# Patient Record
Sex: MALE | URBAN - METROPOLITAN AREA
[De-identification: names, ages, dates, MRNs, and addresses within clinical notes are randomized per-mention and may not be internally consistent; named-entity substitution may affect disease eponyms.]

---

## 2018-03-08 ENCOUNTER — APPOINTMENT (RX ONLY)
Dept: URBAN - METROPOLITAN AREA CLINIC 352 | Facility: CLINIC | Age: 78
Setting detail: DERMATOLOGY
End: 2018-03-08

## 2018-03-08 DIAGNOSIS — L20.89 OTHER ATOPIC DERMATITIS: ICD-10-CM

## 2018-03-08 PROBLEM — D56.9 THALASSEMIA, UNSPECIFIED: Status: ACTIVE | Noted: 2018-03-08

## 2018-03-08 PROBLEM — I10 ESSENTIAL (PRIMARY) HYPERTENSION: Status: ACTIVE | Noted: 2018-03-08

## 2018-03-08 PROBLEM — L30.9 DERMATITIS, UNSPECIFIED: Status: ACTIVE | Noted: 2018-03-08

## 2018-03-08 PROBLEM — M12.9 ARTHROPATHY, UNSPECIFIED: Status: ACTIVE | Noted: 2018-03-08

## 2018-03-08 PROBLEM — N18.9 CHRONIC KIDNEY DISEASE, UNSPECIFIED: Status: ACTIVE | Noted: 2018-03-08

## 2018-03-08 PROBLEM — R23.3 SPONTANEOUS ECCHYMOSES: Status: ACTIVE | Noted: 2018-03-08

## 2018-03-08 PROBLEM — L29.8 OTHER PRURITUS: Status: ACTIVE | Noted: 2018-03-08

## 2018-03-08 PROBLEM — H91.90 UNSPECIFIED HEARING LOSS, UNSPECIFIED EAR: Status: ACTIVE | Noted: 2018-03-08

## 2018-03-08 PROCEDURE — ? COUNSELING

## 2018-03-08 PROCEDURE — ? PATHOLOGY DISCUSSION

## 2018-03-08 PROCEDURE — 99212 OFFICE O/P EST SF 10 MIN: CPT

## 2018-03-08 PROCEDURE — ? ADDITIONAL NOTES

## 2018-03-08 NOTE — PROCEDURE: ADDITIONAL NOTES
Additional Notes: Recommend pt to continue to apply mometasone ointment or the triamcinoke 0.1% cream to control itch\\nPt to keep follow up with immunology

## 2018-03-08 NOTE — HPI: BIOPSY FOLLOW-UP
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
Additional History: Pt has been using mometasone ointment bid orn for itch

## 2018-03-29 ENCOUNTER — APPOINTMENT (RX ONLY)
Dept: URBAN - METROPOLITAN AREA CLINIC 352 | Facility: CLINIC | Age: 78
Setting detail: DERMATOLOGY
End: 2018-03-29

## 2018-03-29 DIAGNOSIS — L82.1 OTHER SEBORRHEIC KERATOSIS: ICD-10-CM

## 2018-03-29 PROCEDURE — ? DEFER

## 2018-03-29 PROCEDURE — ? COUNSELING

## 2018-03-29 PROCEDURE — 99212 OFFICE O/P EST SF 10 MIN: CPT

## 2018-03-29 ASSESSMENT — LOCATION SIMPLE DESCRIPTION DERM: LOCATION SIMPLE: GROIN

## 2018-03-29 ASSESSMENT — LOCATION DETAILED DESCRIPTION DERM: LOCATION DETAILED: SUPRAPUBIC SKIN

## 2018-03-29 ASSESSMENT — LOCATION ZONE DERM: LOCATION ZONE: TRUNK

## 2018-06-07 ENCOUNTER — APPOINTMENT (RX ONLY)
Dept: URBAN - METROPOLITAN AREA CLINIC 352 | Facility: CLINIC | Age: 78
Setting detail: DERMATOLOGY
End: 2018-06-07

## 2018-06-07 DIAGNOSIS — L20.89 OTHER ATOPIC DERMATITIS: ICD-10-CM

## 2018-06-07 DIAGNOSIS — L85.3 XEROSIS CUTIS: ICD-10-CM

## 2018-06-07 DIAGNOSIS — L81.4 OTHER MELANIN HYPERPIGMENTATION: ICD-10-CM

## 2018-06-07 PROBLEM — L20.84 INTRINSIC (ALLERGIC) ECZEMA: Status: ACTIVE | Noted: 2018-06-07

## 2018-06-07 PROCEDURE — ? COUNSELING

## 2018-06-07 PROCEDURE — 99212 OFFICE O/P EST SF 10 MIN: CPT

## 2018-06-07 ASSESSMENT — LOCATION DETAILED DESCRIPTION DERM
LOCATION DETAILED: LEFT SUPERIOR UPPER BACK
LOCATION DETAILED: LEFT MID-UPPER BACK
LOCATION DETAILED: LEFT MEDIAL INFERIOR CHEST

## 2018-06-07 ASSESSMENT — SEVERITY ASSESSMENT: SEVERITY: CLEAR

## 2018-06-07 ASSESSMENT — LOCATION SIMPLE DESCRIPTION DERM
LOCATION SIMPLE: CHEST
LOCATION SIMPLE: LEFT UPPER BACK

## 2018-06-07 ASSESSMENT — LOCATION ZONE DERM: LOCATION ZONE: TRUNK

## 2019-12-19 ENCOUNTER — APPOINTMENT (RX ONLY)
Dept: URBAN - METROPOLITAN AREA CLINIC 352 | Facility: CLINIC | Age: 79
Setting detail: DERMATOLOGY
End: 2019-12-19

## 2019-12-19 DIAGNOSIS — M10.0 IDIOPATHIC GOUT: ICD-10-CM

## 2019-12-19 DIAGNOSIS — L82.1 OTHER SEBORRHEIC KERATOSIS: ICD-10-CM

## 2019-12-19 PROBLEM — D48.5 NEOPLASM OF UNCERTAIN BEHAVIOR OF SKIN: Status: ACTIVE | Noted: 2019-12-19

## 2019-12-19 PROBLEM — M10.042 IDIOPATHIC GOUT, LEFT HAND: Status: ACTIVE | Noted: 2019-12-19

## 2019-12-19 PROCEDURE — ? OBSERVATION

## 2019-12-19 PROCEDURE — 99213 OFFICE O/P EST LOW 20 MIN: CPT

## 2019-12-19 PROCEDURE — ? DEFER

## 2019-12-19 PROCEDURE — ? COUNSELING

## 2019-12-19 ASSESSMENT — LOCATION ZONE DERM
LOCATION ZONE: FINGER
LOCATION ZONE: TRUNK

## 2019-12-19 ASSESSMENT — LOCATION DETAILED DESCRIPTION DERM
LOCATION DETAILED: LEFT MEDIAL BUTTOCK
LOCATION DETAILED: LEFT DISTAL PALMAR INDEX FINGER

## 2019-12-19 ASSESSMENT — LOCATION SIMPLE DESCRIPTION DERM
LOCATION SIMPLE: LEFT BUTTOCK
LOCATION SIMPLE: LEFT INDEX FINGER

## 2019-12-19 NOTE — HPI: RASH
What Type Of Note Output Would You Prefer (Optional)?: Standard Output
How Severe Is Your Rash?: moderate
Is This A New Presentation, Or A Follow-Up?: Rash
Additional History: He went to the ER last night and they gave him Keflex, septra, oxycodone, and hydroxyzine. The swelling started yesterday.

## 2019-12-19 NOTE — PROCEDURE: OBSERVATION
Size Of Lesion In Cm (Optional): 0
Body Location Override (Optional - Billing Will Still Be Based On Selected Body Map Location If Applicable): left index and left middle digit
Detail Level: Detailed

## 2020-01-09 ENCOUNTER — APPOINTMENT (RX ONLY)
Dept: URBAN - METROPOLITAN AREA CLINIC 352 | Facility: CLINIC | Age: 80
Setting detail: DERMATOLOGY
End: 2020-01-09

## 2020-01-09 DIAGNOSIS — L0390 CELLULITIS AND ABSCESS OF UNSPECIFIED SITES: ICD-10-CM | Status: IMPROVED

## 2020-01-09 DIAGNOSIS — L0391 CELLULITIS AND ABSCESS OF UNSPECIFIED SITES: ICD-10-CM | Status: IMPROVED

## 2020-01-09 PROBLEM — D48.5 NEOPLASM OF UNCERTAIN BEHAVIOR OF SKIN: Status: ACTIVE | Noted: 2020-01-09

## 2020-01-09 PROBLEM — L08.9 LOCAL INFECTION OF THE SKIN AND SUBCUTANEOUS TISSUE, UNSPECIFIED: Status: ACTIVE | Noted: 2020-01-09

## 2020-01-09 PROCEDURE — ? OTHER

## 2020-01-09 PROCEDURE — 11102 TANGNTL BX SKIN SINGLE LES: CPT

## 2020-01-09 PROCEDURE — ? BIOPSY BY SHAVE METHOD

## 2020-01-09 PROCEDURE — 99212 OFFICE O/P EST SF 10 MIN: CPT | Mod: 25

## 2020-01-09 NOTE — PROCEDURE: OTHER
Detail Level: Zone
Other (Free Text): Patient is to continue follow up with PCP and rheumatology regarding arthritic changes versus gout. He is to finish antibiotics as directed
Note Text (......Xxx Chief Complaint.): This diagnosis correlates with the

## 2020-01-09 NOTE — PROCEDURE: BIOPSY BY SHAVE METHOD
Render Path Notes In Note?: No
Hemostasis: Markus's
Biopsy Method: Personna blade
Silver Nitrate Text: The wound bed was treated with silver nitrate after the biopsy was performed.
Curettage Text: The wound bed was treated with curettage after the biopsy was performed.
Was A Bandage Applied: Yes
Lab: -[id number]
Billing Type: Third-Party Bill
Lab Facility: [id number]
Detail Level: Detailed
Cryotherapy Text: The wound bed was treated with cryotherapy after the biopsy was performed.
Post-Care Instructions: I reviewed with the patient in detail post-care instructions. Patient is to keep the biopsy site dry overnight, and then apply bacitracin twice daily until healed. Patient may apply hydrogen peroxide soaks to remove any crusting.
Depth Of Biopsy: dermis
X Size Of Lesion In Cm: 0
Biopsy Type: H and E
Consent: Written consent was obtained and risks were reviewed including but not limited to scarring, infection, bleeding, scabbing, incomplete removal, nerve damage and allergy to anesthesia.
Wound Care: Polysporin ointment
Electrodesiccation Text: The wound bed was treated with electrodesiccation after the biopsy was performed.
Notification Instructions: Patient will be notified of biopsy results. However, patient instructed to call the office if not contacted within 2 weeks.
Anesthesia Volume In Cc (Will Not Render If 0): 0.5
Electrodesiccation And Curettage Text: The wound bed was treated with electrodesiccation
Dressing: bandage
Type Of Destruction Used: Electrodesiccation and Curettage

## 2020-01-29 ENCOUNTER — APPOINTMENT (RX ONLY)
Dept: URBAN - METROPOLITAN AREA CLINIC 81 | Facility: CLINIC | Age: 80
Setting detail: DERMATOLOGY
End: 2020-01-29

## 2020-02-19 ENCOUNTER — APPOINTMENT (RX ONLY)
Dept: URBAN - METROPOLITAN AREA CLINIC 81 | Facility: CLINIC | Age: 80
Setting detail: DERMATOLOGY
End: 2020-02-19

## 2020-02-19 VITALS — SYSTOLIC BLOOD PRESSURE: 123 MMHG | DIASTOLIC BLOOD PRESSURE: 51 MMHG | HEART RATE: 67 BPM

## 2020-02-19 PROBLEM — C44.41 BASAL CELL CARCINOMA OF SKIN OF SCALP AND NECK: Status: ACTIVE | Noted: 2020-02-19

## 2020-02-19 PROCEDURE — ? PRESCRIPTION

## 2020-02-19 PROCEDURE — 17311 MOHS 1 STAGE H/N/HF/G: CPT

## 2020-02-19 PROCEDURE — 14021 TIS TRNFR S/A/L 10.1-30 SQCM: CPT

## 2020-02-19 PROCEDURE — ? MOHS SURGERY

## 2020-02-19 PROCEDURE — ? REPAIR NOTE

## 2020-03-04 ENCOUNTER — APPOINTMENT (RX ONLY)
Dept: URBAN - METROPOLITAN AREA CLINIC 81 | Facility: CLINIC | Age: 80
Setting detail: DERMATOLOGY
End: 2020-03-04

## 2020-03-04 DIAGNOSIS — Z48.02 ENCOUNTER FOR REMOVAL OF SUTURES: ICD-10-CM

## 2020-03-04 PROCEDURE — 99024 POSTOP FOLLOW-UP VISIT: CPT

## 2020-03-04 PROCEDURE — ? SUTURE REMOVAL (GLOBAL PERIOD)

## 2020-03-04 ASSESSMENT — LOCATION ZONE DERM: LOCATION ZONE: SCALP

## 2020-03-04 ASSESSMENT — LOCATION SIMPLE DESCRIPTION DERM: LOCATION SIMPLE: POSTERIOR SCALP

## 2020-03-04 ASSESSMENT — LOCATION DETAILED DESCRIPTION DERM: LOCATION DETAILED: RIGHT SUPERIOR OCCIPITAL SCALP

## 2020-03-04 NOTE — PROCEDURE: SUTURE REMOVAL (GLOBAL PERIOD)
Body Location Override (Optional - Billing Will Still Be Based On Selected Body Map Location If Applicable): Rt Inferior Occipital Scalp
Detail Level: Detailed
Add 74882 Cpt? (Important Note: In 2017 The Use Of 43103 Is Being Tracked By Cms To Determine Future Global Period Reimbursement For Global Periods): yes

## 2020-07-09 ENCOUNTER — APPOINTMENT (RX ONLY)
Dept: URBAN - METROPOLITAN AREA CLINIC 352 | Facility: CLINIC | Age: 80
Setting detail: DERMATOLOGY
End: 2020-07-09

## 2020-07-09 DIAGNOSIS — Z71.89 OTHER SPECIFIED COUNSELING: ICD-10-CM

## 2020-07-09 DIAGNOSIS — L82.1 OTHER SEBORRHEIC KERATOSIS: ICD-10-CM

## 2020-07-09 DIAGNOSIS — D22 MELANOCYTIC NEVI: ICD-10-CM

## 2020-07-09 DIAGNOSIS — Z85.828 PERSONAL HISTORY OF OTHER MALIGNANT NEOPLASM OF SKIN: ICD-10-CM

## 2020-07-09 DIAGNOSIS — L81.4 OTHER MELANIN HYPERPIGMENTATION: ICD-10-CM

## 2020-07-09 PROBLEM — D22.5 MELANOCYTIC NEVI OF TRUNK: Status: ACTIVE | Noted: 2020-07-09

## 2020-07-09 PROCEDURE — 99213 OFFICE O/P EST LOW 20 MIN: CPT

## 2020-07-09 PROCEDURE — ? ADDITIONAL NOTES

## 2020-07-09 PROCEDURE — ? RETURN TO REFERRING PROVIDER

## 2020-07-09 PROCEDURE — ? COUNSELING

## 2020-07-09 PROCEDURE — ? FULL BODY SKIN EXAM

## 2020-07-09 ASSESSMENT — LOCATION SIMPLE DESCRIPTION DERM
LOCATION SIMPLE: POSTERIOR NECK
LOCATION SIMPLE: UPPER BACK
LOCATION SIMPLE: CHEST
LOCATION SIMPLE: RIGHT CHEEK

## 2020-07-09 ASSESSMENT — LOCATION ZONE DERM
LOCATION ZONE: FACE
LOCATION ZONE: TRUNK
LOCATION ZONE: NECK

## 2020-07-09 ASSESSMENT — LOCATION DETAILED DESCRIPTION DERM
LOCATION DETAILED: RIGHT SUPERIOR POSTERIOR NECK
LOCATION DETAILED: LOWER STERNUM
LOCATION DETAILED: SUPERIOR THORACIC SPINE
LOCATION DETAILED: RIGHT INFERIOR CENTRAL MALAR CHEEK

## 2021-11-08 NOTE — PROCEDURE: REPAIR NOTE
S-(situation): Patient fell over the weekend and now has pain in rt ankle and rt knee. No redness but mild pain for two days since he fell on the right side of his body.     B-(background): Pain has not inproved on the rt knee and ankle.     A-(assessment): Patient needs assessment of injury from fall.     R-(recommendations): No clinic appointments, patient will go to [facility] for urgent care.           Reason for Disposition    [1] MODERATE pain (e.g., interferes with normal activities, limping) AND [2] present > 3 days    [1] Swollen joint AND [2] no fever or redness    Additional Information    Negative: Followed an ankle injury    Negative: Foot pain is main symptom    Negative: Thigh or calf pain is main symptom    Negative: Thigh or calf swelling is main symptom    Negative: Entire foot is cool or blue in comparison to other side    Negative: [1] Swollen joint AND [2] fever    Negative: [1] Red area or streak AND [2] fever    Negative: Patient sounds very sick or weak to the triager    Negative: [1] SEVERE pain (e.g., excruciating, unable to walk) AND [2] not improved after 2 hours of pain medicine    Negative: [1] Thigh or calf pain AND [2] only 1 side AND [3] present > 1 hour    Negative: [1] Calf swelling AND [2] only 1 side    Negative: [1] Looks infected (spreading redness, pus) AND [2] large red area (> 2 in. or 5 cm)    Negative: Looks like a boil, infected sore, or deep ulcer    Negative: [1] Redness of the skin AND [2] no fever    Negative: [1] Very swollen joint AND [2] no fever    Negative: [1] MILD pain (e.g., does not interfere with normal activities) AND [2] present > 7 days    Negative: Ankle pain is a chronic symptom (recurrent or ongoing AND present > 4 weeks)    Negative: Caused by overuse from recent vigorous activity (e.g., vigorous activity, running)    Negative: Ankle pain    Protocols used: ANKLE PAIN-A-      Tiffany Singh RN  [facility]      Island Pedicle Flap With Canthal Suspension Text: The defect edges were debeveled with a #15 scalpel blade.  Given the location of the defect, shape of the defect and the proximity to free margins an island pedicle advancement flap was deemed most appropriate.  Using a sterile surgical marker, an appropriate advancement flap was drawn incorporating the defect, outlining the appropriate donor tissue and placing the expected incisions within the relaxed skin tension lines where possible. The area thus outlined was incised deep to adipose tissue with a #15 scalpel blade.  The skin margins were undermined to an appropriate distance in all directions around the primary defect and laterally outward around the island pedicle utilizing iris scissors.  There was minimal undermining beneath the pedicle flap. A suspension suture was placed in the canthal tendon to prevent tension and prevent ectropion.

## 2021-12-08 NOTE — PROCEDURE: DEFER
no
Procedure To Be Performed At Next Visit: Biopsy by shave method
Introduction Text (Please End With A Colon): The following procedure was deferred:
Detail Level: Detailed

## 2022-10-26 NOTE — PROCEDURE: REPAIR NOTE
Thierry Samuel      1937    A DME order for supplies has been placed to [facility] 10/18 and today. If there are any issues with your order including not receiving the order please call [facility] at [phone] option 3. They can also provide a tracking number for you if you had supplies shipped to you.    Wound Dressing Change: Right anterior and anterior distal lower leg  Cleanse with mild unscented soap and water  Apply Plurogel to 1/10th piece of 4x5 hydrofera blue ready transfer, then apply to each wound  Apply Spandage size 6 (MTSpandage size 3) from just above toes to just below knee  Then cover with 1 5x9 ABD (on top of Spandage) and secure with 1 roll gauze and tape  Change daily or every other day    Whenever you sit raise your ankle above your hips to promote wound healing.    To Order more plurogel: [email or web address] or call [phone] to place an order for 0.7 oz tube  Use PLUROHEALS (all caps) at checkout in the discount code section to decrease price to $55     You may wash and re-use Spandage for up to a week (hand wash with mild soap, rinse and hang to dry)     Mayela Love PA-C October 26, 2022    Call us at [phone] if you have any questions about your wounds, have redness or swelling around your wound, have a fever of 101 or greater or if you have any other problems or concerns. We answer the phone Monday through Friday 8 am to 4 pm, please leave a message as we check the voicemail frequently throughout the day.     If you had a positive experience please indicate that on your patient satisfaction survey form that [facility] will be sending you.    It was a pleasure meeting with you today.  Thank you for allowing me and my team the privilege of caring for you today.  YOU are the reason we are here, and I truly hope we provided you with the excellent service you deserve.  Please let us know if there is anything else we can do for you so that  we can be sure you are leaving completely satisfied with your care experience.      If you have any billing related questions please call the [facility] Business office at [phone]. The clinic staff does not handle billing related matters.    If you are scheduled to have a follow up appointment, you will receive a reminder call the day before your visit. On the appointment day please arrive 15 minutes prior to your appointment time. If you are unable to keep that appointment, please call the clinic to cancel or reschedule. If you are more than 10 minutes late or greater for your appointment, the clinic policy is that you may be asked to reschedule.    O-T Plasty Text: The defect edges were debeveled with a #15 scalpel blade.  Given the location of the defect, shape of the defect and the proximity to free margins an O-T plasty was deemed most appropriate.  Using a sterile surgical marker, an appropriate O-T plasty was drawn incorporating the defect and placing the expected incisions within the relaxed skin tension lines where possible.    The area thus outlined was incised deep to adipose tissue with a #15 scalpel blade.  The skin margins were undermined to an appropriate distance in all directions utilizing iris scissors.

## 2024-08-01 NOTE — HPI: RASH
How Severe Is Your Rash?: mild
Is This A New Presentation, Or A Follow-Up?: Follow Up Rash
Additional History: Has not had any problems
Yes
